# Patient Record
Sex: FEMALE | Race: BLACK OR AFRICAN AMERICAN | Employment: STUDENT
[De-identification: names, ages, dates, MRNs, and addresses within clinical notes are randomized per-mention and may not be internally consistent; named-entity substitution may affect disease eponyms.]

---
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
## 2021-02-12 PROBLEM — D56.3 ALPHA THALASSEMIA TRAIT: Status: ACTIVE | Noted: 2021-02-12

## 2021-03-15 ENCOUNTER — OFFICE VISIT (OUTPATIENT)
Dept: FAMILY MEDICINE | Facility: CLINIC | Age: 21
End: 2021-03-15
Payer: COMMERCIAL

## 2021-03-15 VITALS
OXYGEN SATURATION: 100 % | BODY MASS INDEX: 22.46 KG/M2 | SYSTOLIC BLOOD PRESSURE: 113 MMHG | WEIGHT: 126.8 LBS | HEART RATE: 71 BPM | RESPIRATION RATE: 16 BRPM | DIASTOLIC BLOOD PRESSURE: 68 MMHG | TEMPERATURE: 98.1 F

## 2021-03-15 DIAGNOSIS — L70.0 ACNE VULGARIS: Primary | ICD-10-CM

## 2021-03-15 PROCEDURE — 99213 OFFICE O/P EST LOW 20 MIN: CPT | Mod: GC | Performed by: STUDENT IN AN ORGANIZED HEALTH CARE EDUCATION/TRAINING PROGRAM

## 2021-03-15 RX ORDER — TRETINOIN 0.1 MG/G
GEL TOPICAL AT BEDTIME
Qty: 45 G | Refills: 0 | Status: SHIPPED | OUTPATIENT
Start: 2021-03-15 | End: 2021-07-27

## 2021-03-15 RX ORDER — TRETINOIN 0.1 MG/G
GEL TOPICAL AT BEDTIME
COMMUNITY
End: 2021-03-15

## 2021-03-15 ASSESSMENT — ANXIETY QUESTIONNAIRES
6. BECOMING EASILY ANNOYED OR IRRITABLE: NOT AT ALL
GAD7 TOTAL SCORE: 0
1. FEELING NERVOUS, ANXIOUS, OR ON EDGE: NOT AT ALL
IF YOU CHECKED OFF ANY PROBLEMS ON THIS QUESTIONNAIRE, HOW DIFFICULT HAVE THESE PROBLEMS MADE IT FOR YOU TO DO YOUR WORK, TAKE CARE OF THINGS AT HOME, OR GET ALONG WITH OTHER PEOPLE: NOT DIFFICULT AT ALL
7. FEELING AFRAID AS IF SOMETHING AWFUL MIGHT HAPPEN: NOT AT ALL
5. BEING SO RESTLESS THAT IT IS HARD TO SIT STILL: NOT AT ALL
2. NOT BEING ABLE TO STOP OR CONTROL WORRYING: NOT AT ALL
3. WORRYING TOO MUCH ABOUT DIFFERENT THINGS: NOT AT ALL

## 2021-03-15 ASSESSMENT — PATIENT HEALTH QUESTIONNAIRE - PHQ9
5. POOR APPETITE OR OVEREATING: NOT AT ALL
SUM OF ALL RESPONSES TO PHQ QUESTIONS 1-9: 0

## 2021-03-15 NOTE — PROGRESS NOTES
Assessment & Plan     Acne vulgaris  Pt presenting with closed comedone acne vulgaris over the cheeks bilaterally. She has been using her sister's tretinoin gel with improvement. Recommended use of cetaphil face wash as well.  - tretinoin (RETIN-A) 0.01 % external gel; Apply topically At Bedtime     Follow up for preventative visit.     No follow-ups on file.    Javi Sorto MD  [facility]    Franco Griffin is a 20 year old who presents for the following health issues     HPI   Patient presents for evaluation of facial acne.  Currently she is not using any prescribed medication, however she has been intermittently trying her sisters T retinoid gel.  Previously she was prescribed benzoyl peroxide and sulfacetamide sodium face wash, she does not remember whether these were helpful or not.    She also inquired about Covid testing.  Patient states that she would just like to know whether she has Covid or not.  She is no specific reason to believe she has Covid, she has not had any exposures to people with known Covid or any other sick people, she has no fever chills shortness of breath change in taste or smell myalgias or other symptoms.    Review of Systems   Constitutional, HEENT, cardiovascular, pulmonary, gi and gu systems are negative, except as otherwise noted.      Objective    /68   Pulse 71   Temp 98.1  F (36.7  C) (Oral)   Resp 16   Wt 57.5 kg (126 lb 12.8 oz)   LMP 03/05/2021 (Approximate)   SpO2 100%   BMI 22.46 kg/m    Body mass index is 22.46 kg/m .  Physical Exam   GENERAL: healthy, alert and no distress  NECK: no adenopathy, no asymmetry, masses, or scars and thyroid normal to palpation  Face: Closed comedones without surrounding erythema or inflammatory changes over the cheeks bilaterally and forehead.  RESP: lungs clear to auscultation - no rales, rhonchi or wheezes  CV: regular rate and rhythm, normal S1 S2, no S3 or S4, no murmur, click or rub,  no peripheral edema and peripheral pulses strong  ABDOMEN: soft, nontender, no hepatosplenomegaly, no masses and bowel sounds normal  MS: no gross musculoskeletal defects noted, no edema

## 2021-03-15 NOTE — PROGRESS NOTES
Preceptor attestation:  Vital signs reviewed: /68   Pulse 71   Temp 98.1  F (36.7  C) (Oral)   Resp 16   Wt 57.5 kg (126 lb 12.8 oz)   LMP 03/05/2021 (Approximate)   SpO2 100%   BMI 22.46 kg/m      Patient seen, evaluated, and discussed with the resident.  I have verified the content of the note, which accurately reflects my assessment of the patient and the plan of care.    Supervising physician: Jolene Burnette MD  [facility]

## 2021-03-16 ASSESSMENT — ANXIETY QUESTIONNAIRES: GAD7 TOTAL SCORE: 0

## 2021-06-04 ENCOUNTER — IMMUNIZATION (OUTPATIENT)
Dept: FAMILY MEDICINE | Facility: CLINIC | Age: 21
End: 2021-06-04
Payer: COMMERCIAL

## 2021-06-04 PROCEDURE — 0011A PR COVID VAC MODERNA 100 MCG/0.5 ML IM: CPT

## 2021-06-04 PROCEDURE — 91301 PR COVID VAC MODERNA 100 MCG/0.5 ML IM: CPT

## 2021-07-08 ENCOUNTER — IMMUNIZATION (OUTPATIENT)
Dept: FAMILY MEDICINE | Facility: CLINIC | Age: 21
End: 2021-07-08
Attending: FAMILY MEDICINE
Payer: COMMERCIAL

## 2021-07-08 PROCEDURE — 0012A PR COVID VAC MODERNA 100 MCG/0.5 ML IM: CPT

## 2021-07-08 PROCEDURE — 91301 PR COVID VAC MODERNA 100 MCG/0.5 ML IM: CPT

## 2021-07-27 ENCOUNTER — OFFICE VISIT (OUTPATIENT)
Dept: FAMILY MEDICINE | Facility: CLINIC | Age: 21
End: 2021-07-27
Payer: COMMERCIAL

## 2021-07-27 VITALS
OXYGEN SATURATION: 100 % | RESPIRATION RATE: 20 BRPM | SYSTOLIC BLOOD PRESSURE: 112 MMHG | DIASTOLIC BLOOD PRESSURE: 71 MMHG | BODY MASS INDEX: 22.46 KG/M2 | HEART RATE: 73 BPM | WEIGHT: 126.8 LBS | TEMPERATURE: 99.1 F

## 2021-07-27 DIAGNOSIS — L70.0 ACNE VULGARIS: Primary | ICD-10-CM

## 2021-07-27 PROCEDURE — 99213 OFFICE O/P EST LOW 20 MIN: CPT | Mod: GC | Performed by: STUDENT IN AN ORGANIZED HEALTH CARE EDUCATION/TRAINING PROGRAM

## 2021-07-27 RX ORDER — DROSPIRENONE AND ETHINYL ESTRADIOL 0.02-3(28)
1 KIT ORAL DAILY
Qty: 54 TABLET | Refills: 11 | Status: SHIPPED | OUTPATIENT
Start: 2021-07-27

## 2021-07-27 NOTE — PROGRESS NOTES
Preceptor Attestation:    I discussed the patient with the resident and evaluated the patient in person. I have verified the content of the note, which accurately reflects my assessment of the patient and the plan of care.   Supervising Physician:  Donte Cruz MD.

## 2021-07-27 NOTE — PATIENT INSTRUCTIONS
- for your acne, it is best to use the followin- facial cleanser   2- facial toner   3 - apply acne medication tretinoin   4 - then moisturize daily.   It is very important to do this daily. If you skin gets too dry, use the tretinoin every other day but continue the cleanser, toner, moisturizer daily.   - I recommend finding a local  at a salon if the above plan is not working after 1 month of trying consistently.   - for the acne scars, you may want to try brightening serum. You won't be able to find that at a drug store, it needs to be given to you by an  or dermatologist.   - start Mary (brith control pill) after your next period. It needs to be taken every night to be effective. If using for contraception, too, and you miss a pill, take the missed pill as soon as you remember or take the missed pill at the same time as you would take the next pill.   - follow up as needed for acne.

## 2021-07-27 NOTE — PROGRESS NOTES
There are no exam notes on file for this visit.    Assessment & Plan    1. Acne vulgaris  Offered contraception pills for treatment of acne along with trying lower potency tretinoin cream for treatment of acne and patient accepted. Discussed importance of using facial cleanser, toner, and moisturizer daily. If daily use of tretinoin is too drying, recommended using every other day. Also discussed seeing an  if this plan does not work.   - drospirenone-ethinyl estradiol (AIDE) 3-0.02 MG tablet; Take 1 tablet by mouth daily  Dispense: 54 tablet; Refill: 11  - tretinoin (RENOVA) 0.02 % external cream; Apply to face once daily.  Dispense: 20 g; Refill: 3      Preventative Health: Due for CPE, scheduled for 7/30/21    30 minutes spent on the date of the encounter doing chart review, history and exam, documentation and further activities per the note    No follow-ups on file.    Benita Behm, MD PGY2  [facility]  07/27/21    I precepted today with Dr. Cruz.    Subjective   Gaby Newman is a 20 year old who presents for the following health issues: acne and dark spots follow up.     HPI   Patient was prescribed tretinoin 0.02% gel in March after finding this worked well for her sister's acne. She did not keep up with this as it dried out her skin too much. She would like to try other options for her acne.     Review of Systems  Constitutional, HEENT, cardiovascular, pulmonary, gi and gu systems are negative, except as otherwise noted.    Objective   /71   Pulse 73   Temp 99.1  F (37.3  C) (Oral)   Resp 20   Wt 57.5 kg (126 lb 12.8 oz)   LMP 07/05/2021 (Approximate)   SpO2 100%   BMI 22.46 kg/m    Physical Exam  Constitutional: Awake, alert, cooperative, no apparent distress, and appears stated age.  Eyes: Lids and lashes normal, pupils equal, round and reactive to light, extra ocular muscles intact, sclera clear, conjunctiva normal.  ENT: Normocephalic, without obvious  abnormality, atraumatic, external ears without lesions.  Neck: Supple, symmetrical, trachea midline, skin normal.  Lungs: No increased work of breathing  Musculoskeletal: Full range of motion noted. Tone is normal.  Neurologic: Awake, alert, oriented to name, place and time.Gait is normal.  Neuropsychiatric: Normal affect, mood, orientation, memory and insight.  Skin: Multiple closed comedones on cheeks and forehead along with hyperpigmented macules/scars. No cysts. No visible rashes, erythema, pallor, petechia or purpura.    ----- Service Performed and Documented by Resident or Fellow ------

## 2021-08-06 ENCOUNTER — OFFICE VISIT (OUTPATIENT)
Dept: FAMILY MEDICINE | Facility: CLINIC | Age: 21
End: 2021-08-06
Payer: COMMERCIAL

## 2021-08-06 VITALS
TEMPERATURE: 98.6 F | BODY MASS INDEX: 22.99 KG/M2 | OXYGEN SATURATION: 99 % | DIASTOLIC BLOOD PRESSURE: 70 MMHG | RESPIRATION RATE: 16 BRPM | HEART RATE: 76 BPM | SYSTOLIC BLOOD PRESSURE: 115 MMHG | WEIGHT: 129.8 LBS

## 2021-08-06 DIAGNOSIS — Z23 NEED FOR VACCINATION: ICD-10-CM

## 2021-08-06 DIAGNOSIS — L70.0 ACNE VULGARIS: ICD-10-CM

## 2021-08-06 DIAGNOSIS — Z00.129 ENCOUNTER FOR ROUTINE CHILD HEALTH EXAMINATION WITHOUT ABNORMAL FINDINGS: Primary | ICD-10-CM

## 2021-08-06 DIAGNOSIS — N94.6 MENSTRUAL CRAMPS: ICD-10-CM

## 2021-08-06 PROCEDURE — 99395 PREV VISIT EST AGE 18-39: CPT | Mod: 25 | Performed by: STUDENT IN AN ORGANIZED HEALTH CARE EDUCATION/TRAINING PROGRAM

## 2021-08-06 PROCEDURE — 90734 MENACWYD/MENACWYCRM VACC IM: CPT | Performed by: STUDENT IN AN ORGANIZED HEALTH CARE EDUCATION/TRAINING PROGRAM

## 2021-08-06 PROCEDURE — 90472 IMMUNIZATION ADMIN EACH ADD: CPT | Performed by: STUDENT IN AN ORGANIZED HEALTH CARE EDUCATION/TRAINING PROGRAM

## 2021-08-06 PROCEDURE — 99173 VISUAL ACUITY SCREEN: CPT | Mod: 59 | Performed by: STUDENT IN AN ORGANIZED HEALTH CARE EDUCATION/TRAINING PROGRAM

## 2021-08-06 PROCEDURE — 92551 PURE TONE HEARING TEST AIR: CPT | Performed by: STUDENT IN AN ORGANIZED HEALTH CARE EDUCATION/TRAINING PROGRAM

## 2021-08-06 PROCEDURE — 90651 9VHPV VACCINE 2/3 DOSE IM: CPT | Performed by: STUDENT IN AN ORGANIZED HEALTH CARE EDUCATION/TRAINING PROGRAM

## 2021-08-06 PROCEDURE — 90471 IMMUNIZATION ADMIN: CPT | Performed by: STUDENT IN AN ORGANIZED HEALTH CARE EDUCATION/TRAINING PROGRAM

## 2021-08-06 RX ORDER — IBUPROFEN 800 MG/1
800 TABLET, FILM COATED ORAL EVERY 8 HOURS PRN
Qty: 30 TABLET | Refills: 3 | Status: SHIPPED | OUTPATIENT
Start: 2021-08-06

## 2021-08-06 RX ORDER — TRETINOIN 0.25 MG/G
CREAM TOPICAL AT BEDTIME
Qty: 45 G | Refills: 3 | Status: SHIPPED | OUTPATIENT
Start: 2021-08-06

## 2021-08-06 ASSESSMENT — PATIENT HEALTH QUESTIONNAIRE - PHQ9: SUM OF ALL RESPONSES TO PHQ QUESTIONS 1-9: 2

## 2021-08-06 NOTE — NURSING NOTE
Well child hearing and vision screening        HEARING FREQUENCY:    For conditioning purpose only  Right ear: 40db at 1000Hz: present    Right Ear:    20db at 1000Hz: present  20db at 2000Hz: present  20db at 4000Hz: present  20db at 6000Hz (11 years and older): present    Left Ear:    20db at 6000Hz (11 years and older): present  20db at 4000Hz: present  20db at 2000Hz: present  20db at 1000Hz: present    Right Ear:    25db at 500Hz: present    Left Ear:    25db at 500Hz: present    Hearing Screen:  Pass-- [place] all tones    VISION:  Far vision: Right eye 10/8, Left eye 10/8, with no corrective lens    Aliza Daniels CMA,

## 2021-08-06 NOTE — PROGRESS NOTES
"  Child & Teen Check Up Year 18-20       Health History       Growth Percentile:    Wt Readings from Last 3 Encounters:   08/06/21 58.9 kg (129 lb 12.8 oz) (53 %, Z= 0.07)*   07/27/21 57.5 kg (126 lb 12.8 oz) (47 %, Z= -0.07)*   03/15/21 57.5 kg (126 lb 12.8 oz) (48 %, Z= -0.04)*     * Growth percentiles are based on [facility] (Girls, 2-20 Years) data.      Ht Readings from Last 2 Encounters:   03/06/20 1.6 m (5' 3\") (31 %, Z= -0.49)*   12/07/18 1.6 m (5' 3\") (32 %, Z= -0.46)*     * Growth percentiles are based on [facility] (Girls, 2-20 Years) data.    64 %ile (Z= 0.35) based on [facility] (Girls, 2-20 Years) BMI-for-age data using weight from 8/6/2021 and height from 3/6/2020.    Visit Vitals: /70 (BP Location: Left arm, Patient Position: Sitting, Cuff Size: Adult Regular)   Pulse 76   Temp 98.6  F (37  C) (Oral)   Resp 16   Wt 58.9 kg (129 lb 12.8 oz)   LMP 08/02/2021 (Approximate)   SpO2 99%   BMI 22.99 kg/m    BP Percentile: Blood pressure percentiles are not available for patients who are 18 years or older.    Informant: Patient    Patient, Family speaks English, Swahili, Yi and so an  was not used.  Family History:   Family History   Problem Relation Age of Onset     Diabetes No family hx of      Coronary Artery Disease No family hx of      Hypertension No family hx of      Other Cancer No family hx of        Dyslipidemia Screening:  Pediatric hyperlipidemia risk factors discussed today: No increased risk  Lipid screening performed (recommended if any risk factors): No    Social History:     Did the family/guardian worry about wether their food would run out before they got money to buy more? No  Did the family/guardian find that the food they bought didn't last long enough and they didn't have money to get more?  No    Social History     Socioeconomic History     Marital status: Single     Spouse name: Not on file     Number of children: Not on file     Years of education: Not on file     Highest " "education level: Not on file   Occupational History     Not on file   Tobacco Use     Smoking status: Never Smoker     Smokeless tobacco: Never Used   Vaping Use     Vaping Use: Never used   Substance and Sexual Activity     Alcohol use: No     Drug use: No     Sexual activity: Not Currently   Other Topics Concern     Not on file   Social History Narrative     Not on file     Social Determinants of Health     Financial Resource Strain:      Difficulty of Paying Living Expenses:    Food Insecurity:      Worried About Running Out of Food in the Last Year:      Ran Out of Food in the Last Year:    Transportation Needs:      Lack of Transportation (Medical):      Lack of Transportation (Non-Medical):    Physical Activity:      Days of Exercise per Week:      Minutes of Exercise per Session:    Stress:      Feeling of Stress :    Social Connections:      Frequency of Communication with Friends and Family:      Frequency of Social Gatherings with Friends and Family:      Attends Hinduism Services:      Active Member of Clubs or Organizations:      Attends Club or Organization Meetings:      Marital Status:    Intimate Partner Violence:      Fear of Current or Ex-Partner:      Emotionally Abused:      Physically Abused:      Sexually Abused:            Medical History: History reviewed. No pertinent past medical history.    Family History and past Medical History reviewed and unchanged/updated.    Vision Screen: Passed.  Hearing Screen: Passed.  Parental/or patient concerns: Neck pain. First noticed it about 10 days ago. Felt like a sharp \"shot\" of pain from inside. She had the pain every day for about a week. She is now feeling better and has not felt the pain for many days.  Acne- tretinoin cream was not covered. She would like to try a different prescription.   Reports that she has a lot of cramping the first day of her period. Her period is most heavy the first day, but she never needs more than 3 pads/tampons in a " day.    Daily Activities:    Nutrition:    Describe intake: bean, vegetables, meat, rice. Does not eat a lot of sugary foods or processed foods. BMI normal.     Environmental Risks:  TB exposure: No  Guns in house:None    STI Screening:  STI (including HIV) risk behaviors discussed today: Yes  HIV Screening (required once between ages 15-18 yrs): Previously completed  Other STI screening preformed (recommended if risk factors): No    Dental:  Have you been to a dentist this year? No-Verbal referral made  for dental check-up     Mental Health:  Teen Screen Discussed?: Yes          ROS   GENERAL: no recent fevers and activity level has been normal  SKIN: Negative for rash, birthmarks, acne, pigmentation changes  HEENT: Negative for hearing problems, vision problems, nasal congestion, eye discharge and eye redness  RESP: No cough, wheezing, difficulty breathing  CV: No cyanosis, fatigue with feeding  GI: Normal stools for age, no diarrhea or constipation   : Normal urination, no disharge or painful urination  MS: No swelling, muscle weakness, joint problems  NEURO: Moves all extremeties normally, normal activity for age  ALLERGY/IMMUNE: See allergy in history         Physical Exam:   /70 (BP Location: Left arm, Patient Position: Sitting, Cuff Size: Adult Regular)   Pulse 76   Temp 98.6  F (37  C) (Oral)   Resp 16   Wt 58.9 kg (129 lb 12.8 oz)   LMP 08/02/2021 (Approximate)   SpO2 99%   BMI 22.99 kg/m     GENERAL: Alert, well nourished, well developed, no acute distress, interacts appropriately for age  SKIN: papulopustular acne noted on forehead, cheeks, and chin. skin is otherwise clear, no rash, abnormal pigmentation or lesions on exposed skin  HEAD: The head is normocephalic.  EYES:The conjunctivae and cornea normal. PERRL, EOMI.   EARS: The external auditory canals are clear and the tympanic membranes are normal; gray and transluscent.  NOSE: Clear, no discharge or congestion  MOUTH/THROAT: The  throat is clear, tonsils:normal, no exudate or lesions. Normal teeth without obvious abnormalities  NECK: The neck is supple and thyroid is normal, no masses. No cervical spine tenderness or paraspinal tenderness.  LYMPH NODES: No adenopathy  LUNGS: The lung fields are clear to auscultation,no rales, rhonchi, wheezing or retractions  HEART: The precordium is quiet. Rhythm is regular. S1 and S2 are normal. No murmurs.  ABDOMEN: The bowel sounds are normal. Abdomen soft, non tender,  non distended, no masses or hepatosplenomegaly.  F-GENITALIA: deferred per patient preference   F-BREASTS: deferred per patient preference   EXTREMITIES: Symmetric extremities, FROM, no deformities. Spine is straight, no scoliosis  NEUROLOGIC: No focal findings. Cranial nerves grossly intact: DTR's normal. Normal gait, strength and tone         Assessment and Plan   Reason for Visit:   Chief Complaint   Patient presents with     Well Child C&TC     20 year wcc / In the past neck pain on the back of her neck      other     med check for acne Talk about discount on medication     Additional Diagnoses:    Acne vulgaris: Tretinoin cream 0.025% cream sent to pharmacy as previous cream was not covered  Neck pain: Suspicious for a muscle strain, which has now resolved. Recommended trying ibuprofen 800 mg as needed, no more than 3 times per day, if it returns. Also recommended that she make an appointment if the pain returns.     Menstrual cramps: Has significant cramping with the first day of her menstrual period.  The bleeding is heavy as the first day, but she does not require more than 3 pads in a day, so no concern about heavy menstrual bleeding at this time.  Recommended that she try ibuprofen 800 mg as needed, up to 3 times a day, for the menstrual cramps.  Recommended follow-up if this does not improve.    Due for dental visit.  List of dental offices provided.  No referrals were made today.    Patient Health Questionnaire - 9    [unfilled]    No concerns. Routine follow-up.    Immunizations:    Hx immunization reactions?  No  Immunization schedule reviewed: Yes:  Following immunizations advised:  Tdap (if not given when entering 7th grade) Up to date for this immunization  Influenza if in season: not in season  Meningococcal (MCV) (If given before age 16 needs a booster at 16+ yo Offered and accepted.  HPV Vaccine (Gardasil)  recommended for all at age 11 years: Offered and accepted.    Due for hepatitis A, but declined today due to getting 2 other vaccines today. Appointment made for 8/9/21.    Labs:  Not needed.     Schedule next visit in 2 years    I precepted today with Dr. Ignacio Roca MD.    Oly Monge MD, PGY-2  [facility]  August 6, 2021

## 2021-08-06 NOTE — PROGRESS NOTES
Preceptor Attestation:    I discussed the patient with the resident and evaluated the patient in person. I have verified the content of the note, which accurately reflects my assessment of the patient and the plan of care.   Supervising Physician:  Ignacio Roca MD.

## 2021-08-06 NOTE — PATIENT INSTRUCTIONS
Plan:  1. It was a pleasure seeing you in clinic today.  2. Try taking 800 mg of ibuprofen if your neck pain returns. You could do this up to 3 times per day.  3. You can take ibuprofen 800 mg for cramping with your period. Do not take more than 3 in one day.  4. I sent a new acne cream to your pharmacy. Use this as Dr. Behm directed.  5. I gave you a list of dentists. Please make an appointment to have your teeth cleaned and examined.    [facility]  Phone: [phone]  Address: [address] [place], [place] [id number]

## 2021-08-09 ENCOUNTER — ALLIED HEALTH/NURSE VISIT (OUTPATIENT)
Dept: FAMILY MEDICINE | Facility: CLINIC | Age: 21
End: 2021-08-09
Payer: COMMERCIAL

## 2021-08-09 VITALS
HEART RATE: 64 BPM | TEMPERATURE: 98.4 F | WEIGHT: 124 LBS | DIASTOLIC BLOOD PRESSURE: 71 MMHG | BODY MASS INDEX: 21.97 KG/M2 | RESPIRATION RATE: 18 BRPM | SYSTOLIC BLOOD PRESSURE: 109 MMHG

## 2021-08-09 DIAGNOSIS — Z23 NEED FOR VACCINATION: Primary | ICD-10-CM

## 2021-08-09 PROCEDURE — 90632 HEPA VACCINE ADULT IM: CPT

## 2021-08-09 PROCEDURE — 90471 IMMUNIZATION ADMIN: CPT

## 2022-02-17 PROBLEM — K21.9 GASTROESOPHAGEAL REFLUX DISEASE: Status: ACTIVE | Noted: 2018-08-09

## 2022-07-28 ENCOUNTER — OFFICE VISIT (OUTPATIENT)
Dept: FAMILY MEDICINE | Facility: CLINIC | Age: 22
End: 2022-07-28
Payer: COMMERCIAL

## 2022-07-28 VITALS
OXYGEN SATURATION: 94 % | WEIGHT: 120.6 LBS | HEART RATE: 83 BPM | HEIGHT: 63 IN | RESPIRATION RATE: 12 BRPM | BODY MASS INDEX: 21.37 KG/M2 | SYSTOLIC BLOOD PRESSURE: 117 MMHG | DIASTOLIC BLOOD PRESSURE: 63 MMHG | TEMPERATURE: 98.5 F

## 2022-07-28 DIAGNOSIS — L70.0 ACNE VULGARIS: Primary | ICD-10-CM

## 2022-07-28 PROCEDURE — 99214 OFFICE O/P EST MOD 30 MIN: CPT | Mod: GC | Performed by: STUDENT IN AN ORGANIZED HEALTH CARE EDUCATION/TRAINING PROGRAM

## 2022-07-28 RX ORDER — TRETINOIN 0.5 MG/G
CREAM TOPICAL AT BEDTIME
Qty: 45 G | Refills: 4 | Status: SHIPPED | OUTPATIENT
Start: 2022-07-28

## 2022-07-28 NOTE — PROGRESS NOTES
"Preceptor attestation:  Vital signs reviewed: /63 (BP Location: Left arm, Patient Position: Sitting, Cuff Size: Adult Small)   Pulse 83   Temp 98.5  F (36.9  C) (Oral)   Resp 12   Ht 1.601 m (5' 3.03\")   Wt 54.7 kg (120 lb 9.6 oz)   LMP 07/18/2022 (Exact Date)   SpO2 94%   BMI 21.34 kg/m      Patient seen, evaluated, and discussed with the resident.  I have verified the content of the note, which accurately reflects my assessment of the patient and the plan of care.    Supervising physician: Jolene Burnette MD  [facility]    "

## 2022-07-28 NOTE — PROGRESS NOTES
"  Assessment & Plan     Acne vulgaris  - tretinoin (RETIN-A) 0.05 % external cream; Apply topically At Bedtime  - Apply sunscreen with SPF >30 daily   - Eat more balance diet with more vegetables and fruits, limiting dairy and fast foods   - Return in clinic in 1 month for CPE       Subjective   Gaby is a 21 year old, who presents to the clinic today due to acne. She states that in the past 2 weeks she noticed that her facial acne has gotten worse. She was seen last year for facial acne and was prescribed Retin A and oral contraceptives.  She states that she tried it for a short time and they both did not work. Otherwise patient does not express other concerns.       Constitutional, HEENT, cardiovascular, pulmonary, gi and gu systems are negative, except with facial acne.       Objective    /63 (BP Location: Left arm, Patient Position: Sitting, Cuff Size: Adult Small)   Pulse 83   Temp 98.5  F (36.9  C) (Oral)   Resp 12   Ht 1.601 m (5' 3.03\")   Wt 54.7 kg (120 lb 9.6 oz)   LMP 07/18/2022 (Exact Date)   SpO2 94%   BMI 21.34 kg/m    Body mass index is 21.34 kg/m .  Physical Exam  Constitutional:       General: She is not in acute distress.     Appearance: Normal appearance.   HENT:      Mouth/Throat:      Mouth: Mucous membranes are moist.   Cardiovascular:      Rate and Rhythm: Normal rate and regular rhythm.   Pulmonary:      Effort: Pulmonary effort is normal.   Skin:     General: Skin is warm.      Findings: Rash present.      Comments: Facial papulopustular acne.    Neurological:      Mental Status: She is alert.             Kati Rivera MD  [facility]        .  ..  "

## 2023-02-24 ENCOUNTER — ALLIED HEALTH/NURSE VISIT (OUTPATIENT)
Dept: FAMILY MEDICINE | Facility: CLINIC | Age: 23
End: 2023-02-24
Payer: COMMERCIAL

## 2023-02-24 VITALS — TEMPERATURE: 98.1 F

## 2023-02-24 DIAGNOSIS — Z23 NEED FOR PROPHYLACTIC VACCINATION AND INOCULATION AGAINST INFLUENZA: Primary | ICD-10-CM

## 2023-02-24 PROCEDURE — 99207 PR NO BILLABLE SERVICE THIS VISIT: CPT

## 2023-02-24 PROCEDURE — 90686 IIV4 VACC NO PRSV 0.5 ML IM: CPT

## 2023-02-24 PROCEDURE — 90471 IMMUNIZATION ADMIN: CPT

## 2024-03-14 ENCOUNTER — ALLIED HEALTH/NURSE VISIT (OUTPATIENT)
Dept: FAMILY MEDICINE | Facility: CLINIC | Age: 24
End: 2024-03-14
Payer: COMMERCIAL

## 2024-03-14 VITALS
BODY MASS INDEX: 22.3 KG/M2 | HEART RATE: 73 BPM | WEIGHT: 126 LBS | SYSTOLIC BLOOD PRESSURE: 106 MMHG | OXYGEN SATURATION: 100 % | DIASTOLIC BLOOD PRESSURE: 69 MMHG | TEMPERATURE: 99.3 F

## 2024-03-14 DIAGNOSIS — Z23 HIGH PRIORITY FOR 2019-NCOV VACCINE: Primary | ICD-10-CM

## 2024-03-14 PROCEDURE — 99207 PR NO CHARGE NURSE ONLY: CPT

## 2024-03-14 PROCEDURE — 91320 SARSCV2 VAC 30MCG TRS-SUC IM: CPT

## 2024-03-14 PROCEDURE — 90480 ADMN SARSCOV2 VAC 1/ONLY CMP: CPT

## 2024-03-14 NOTE — PROGRESS NOTES
Prior to immunization administration, verified patients identity using patient s name and date of birth. Please see Immunization Activity for additional information.     Screening Questionnaire for Adult Immunization    Are you sick today?   No   Do you have allergies to medications, food, a vaccine component or latex?   No   Have you ever had a serious reaction after receiving a vaccination?   No   Do you have a long-term health problem with heart, lung, kidney, or metabolic disease (e.g., diabetes), asthma, a blood disorder, no spleen, complement component deficiency, a cochlear implant, or a spinal fluid leak?  Are you on long-term aspirin therapy?   No   Do you have cancer, leukemia, HIV/AIDS, or any other immune system problem?   No   Do you have a parent, brother, or sister with an immune system problem?   No   In the past 3 months, have you taken medications that affect  your immune system, such as prednisone, other steroids, or anticancer drugs; drugs for the treatment of rheumatoid arthritis, Crohn s disease, or psoriasis; or have you had radiation treatments?   No   Have you had a seizure, or a brain or other nervous system problem?   No   During the past year, have you received a transfusion of blood or blood    products, or been given immune (gamma) globulin or antiviral drug?   No   For women: Are you pregnant or is there a chance you could become       pregnant during the next month?   No   Have you received any vaccinations in the past 4 weeks?   No     Immunization questionnaire answers were all negative.    I have reviewed the following standing orders:   This patient is due and qualifies for the Covid-19 vaccine.     Click here for COVID-19 Standing Order    I have reviewed the vaccines inclusion and exclusion criteria; No concerns regarding eligibility.     Patient instructed to remain in clinic for 15 minutes afterwards, and to report any adverse reactions.     Screening performed by Magdalena BARTLETT  Jacques, CMA on 3/14/2024 at 10:44 AM.